# Patient Record
Sex: FEMALE | Race: WHITE | NOT HISPANIC OR LATINO | Employment: FULL TIME | URBAN - NONMETROPOLITAN AREA
[De-identification: names, ages, dates, MRNs, and addresses within clinical notes are randomized per-mention and may not be internally consistent; named-entity substitution may affect disease eponyms.]

---

## 2017-11-03 ENCOUNTER — OFFICE VISIT (OUTPATIENT)
Dept: URGENT CARE | Facility: CLINIC | Age: 27
End: 2017-11-03
Payer: COMMERCIAL

## 2017-11-03 PROCEDURE — 99203 OFFICE O/P NEW LOW 30 MIN: CPT

## 2017-11-03 PROCEDURE — S9088 SERVICES PROVIDED IN URGENT: HCPCS

## 2017-11-04 NOTE — PROGRESS NOTES
Assessment  1  Acute bronchitis, viral (466 0) (J20 8)    Plan  Acute bronchitis, viral    · Azithromycin 250 MG Oral Tablet (Zithromax Z-Riaz); TAKE 2 TABLETS ON DAY 1  THEN TAKE 1 TABLET A DAY FOR 4 DAYS   · DrRx Phenergan with Codeine 118 ML; 1 tsp every 4 hrs as needed   Disp 4 oz    Discussion/Summary  Discussion Summary:   Cough medicine and antibiotic as directed  Increase fluids 6 glasses of water a day  Steam treatments may be helpful  Sleeping propped up may be helpful  Understands and agrees with treatment plan: The treatment plan was reviewed with the patient/guardian  The patient/guardian understands and agrees with the treatment plan   Counseling Documentation With Imm: The patient was counseled regarding instructions for management  Chief Complaint  1  Cold Symptoms  Chief Complaint Free Text Note Form: C/o cough congestion for 2 weeks  History of Present Illness  HPI: THE COUGH IS UNREMITTING PARTICULARLY AT NIGHT  SHE HAS A KNOWN HISTORY OF ASTHMA  Hospital Based Practices Required Assessment:   Pain Assessment   the patient states they do not have pain  (on a scale of 0 to 10, the patient rates the pain at 0 )   Abuse And Domestic Violence Screen    Yes, the patient is safe at home  -- The patient states no one is hurting them  Depression And Suicide Screen  No, the patient has not had thoughts of hurting themself  No, the patient has not felt depressed in the past 7 days  Prefered Language is  [place]  Primary Language is  English  Review of Systems  Focused-Female:   ENT: as noted in HPI  Respiratory: as noted in HPI  Past Medical History  Active Problems And Past Medical History Reviewed: The active problems and past medical history were reviewed and updated today  Social History  Social History Reviewed: The social history was reviewed and updated today  Current Meds   1  Mirena IUD; Therapy: ([phone]) to Recorded    Allergies  1   No Known Drug Allergies    Vitals  Signs   Recorded: [id number] 05:31PM   Temperature: 96 4 F  Heart Rate: 81  Respiration: 18  Systolic: 541  Diastolic: 82  O2 Saturation: 100    Physical Exam    Eyes   Conjunctiva and lids: No swelling, erythema or discharge  Ears, Nose, Mouth, and Throat   External inspection of ears and nose: Normal     Otoscopic examination: Tympanic membranes translucent with normal light reflex  Canals patent without erythema  Nasal mucosa, septum, and turbinates: Normal without edema or erythema  Oropharynx: Normal with no erythema, edema, exudate or lesions  Pulmonary   Auscultation of lungs: Clear to auscultation  Cardiovascular   Auscultation of heart: Normal rate and rhythm, normal S1 and S2, without murmurs         Signatures   Electronically signed by : KITTY Peraza ; Nov  3 2017  5:48PM EST                       (Author)

## 2020-02-19 ENCOUNTER — APPOINTMENT (EMERGENCY)
Dept: CT IMAGING | Facility: HOSPITAL | Age: 30
End: 2020-02-19
Payer: COMMERCIAL

## 2020-02-19 ENCOUNTER — HOSPITAL ENCOUNTER (EMERGENCY)
Facility: HOSPITAL | Age: 30
Discharge: HOME | End: 2020-02-19
Attending: EMERGENCY MEDICINE | Admitting: EMERGENCY MEDICINE
Payer: COMMERCIAL

## 2020-02-19 ENCOUNTER — APPOINTMENT (EMERGENCY)
Dept: RADIOLOGY | Facility: HOSPITAL | Age: 30
End: 2020-02-19
Payer: COMMERCIAL

## 2020-02-19 VITALS
HEIGHT: 69 IN | BODY MASS INDEX: 25.01 KG/M2 | TEMPERATURE: 98.3 F | SYSTOLIC BLOOD PRESSURE: 136 MMHG | HEART RATE: 81 BPM | WEIGHT: 168.87 LBS | DIASTOLIC BLOOD PRESSURE: 64 MMHG | RESPIRATION RATE: 18 BRPM | OXYGEN SATURATION: 100 %

## 2020-02-19 DIAGNOSIS — S13.4XXA WHIPLASH INJURIES, INITIAL ENCOUNTER: ICD-10-CM
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
## 2020-02-19 PROCEDURE — 72125 CT NECK SPINE W/O DYE: CPT

## 2020-02-19 PROCEDURE — 70450 CT HEAD/BRAIN W/O DYE: CPT

## 2020-02-19 PROCEDURE — 71046 X-RAY EXAM CHEST 2 VIEWS: CPT

## 2020-02-19 PROCEDURE — 99284 EMERGENCY DEPT VISIT MOD MDM: CPT | Performed by: EMERGENCY MEDICINE

## 2020-02-19 PROCEDURE — 72072 X-RAY EXAM THORAC SPINE 3VWS: CPT

## 2020-02-19 PROCEDURE — 99284 EMERGENCY DEPT VISIT MOD MDM: CPT

## 2020-02-19 RX ORDER — NAPROXEN 500 MG/1
500 TABLET ORAL 2 TIMES DAILY WITH MEALS
Qty: 30 TABLET | Refills: 0 | Status: SHIPPED | OUTPATIENT
Start: 2020-02-19

## 2020-02-19 RX ORDER — IBUPROFEN 600 MG/1
600 TABLET ORAL ONCE
Status: COMPLETED | OUTPATIENT
Start: 2020-02-19 | End: 2020-02-19

## 2020-02-19 RX ORDER — CYCLOBENZAPRINE HCL 10 MG
10 TABLET ORAL ONCE
Status: COMPLETED | OUTPATIENT
Start: 2020-02-19 | End: 2020-02-19

## 2020-02-19 RX ORDER — CYCLOBENZAPRINE HCL 10 MG
10 TABLET ORAL 2 TIMES DAILY PRN
Qty: 20 TABLET | Refills: 0 | Status: SHIPPED | OUTPATIENT
Start: 2020-02-19

## 2020-02-19 RX ADMIN — CYCLOBENZAPRINE HYDROCHLORIDE 10 MG: 10 TABLET, FILM COATED ORAL at 16:19

## 2020-02-19 RX ADMIN — IBUPROFEN 600 MG: 600 TABLET ORAL at 16:19
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
## 2021-01-26 ENCOUNTER — DOCTOR'S OFFICE (OUTPATIENT)
Dept: URBAN - NONMETROPOLITAN AREA CLINIC 1 | Facility: CLINIC | Age: 31
Setting detail: OPHTHALMOLOGY
End: 2021-01-26

## 2021-01-26 ENCOUNTER — RX ONLY (RX ONLY)
Age: 31
End: 2021-01-26

## 2021-01-26 DIAGNOSIS — Z01.00: ICD-10-CM

## 2021-01-26 DIAGNOSIS — H52.03: ICD-10-CM

## 2021-01-26 DIAGNOSIS — H52.223: ICD-10-CM

## 2021-01-26 PROCEDURE — VIS/EMPLOY VIS/EMPLOYEE YEARLY VISION BENEFIT: Performed by: OPTOMETRIST

## 2021-01-26 ASSESSMENT — REFRACTION_MANIFEST
OD_VA1: 20/25
OS_SPHERE: PLANO
OD_CYLINDER: -0.75
OD_SPHERE: +0.50
OD_AXIS: 155
OD_SPHERE: +1.00
OS_VA1: 20/25
OS_VA1: 20/25
OD_CYLINDER: -0.75
OS_AXIS: 025
OS_SPHERE: +0.50
OS_CYLINDER: -0.75
OD_AXIS: 155
OS_AXIS: 025
OS_CYLINDER: -0.75
OD_VA1: 20/25

## 2021-01-26 ASSESSMENT — REFRACTION_AUTOREFRACTION
OS_SPHERE: 0.00
OS_AXIS: 028
OS_CYLINDER: -1.00
OD_CYLINDER: -1.25
OD_SPHERE: +0.50
OD_AXIS: 154

## 2021-01-26 ASSESSMENT — REFRACTION_CURRENTRX
OD_SPHERE: +0.25
OS_OVR_VA: 20/
OD_AXIS: 162
OS_VPRISM_DIRECTION: SV
OD_VPRISM_DIRECTION: SV
OD_OVR_VA: 20/
OS_AXIS: 020
OD_CYLINDER: -0.75
OS_CYLINDER: -0.75
OS_SPHERE: -0.25

## 2021-01-26 ASSESSMENT — TONOMETRY
OD_IOP_MMHG: 16
OS_IOP_MMHG: 16

## 2021-01-26 ASSESSMENT — VISUAL ACUITY
OS_BCVA: 20/60-2
OD_BCVA: 20/50-2

## 2021-01-26 ASSESSMENT — CONFRONTATIONAL VISUAL FIELD TEST (CVF)
OD_FINDINGS: FULL
OS_FINDINGS: FULL

## 2021-01-26 ASSESSMENT — SPHEQUIV_DERIVED
OS_SPHEQUIV: -0.5
OD_SPHEQUIV: -0.125
OS_SPHEQUIV: 0.125
OD_SPHEQUIV: 0.625
OD_SPHEQUIV: 0.125

## 2022-08-10 ENCOUNTER — OFFICE VISIT (OUTPATIENT)
Dept: URGENT CARE | Facility: CLINIC | Age: 32
End: 2022-08-10
Payer: COMMERCIAL

## 2022-08-10 ENCOUNTER — APPOINTMENT (OUTPATIENT)
Dept: URGENT CARE | Facility: CLINIC | Age: 32
End: 2022-08-10
Payer: COMMERCIAL

## 2022-08-10 ENCOUNTER — APPOINTMENT (OUTPATIENT)
Dept: RADIOLOGY | Facility: CLINIC | Age: 32
End: 2022-08-10
Payer: COMMERCIAL

## 2022-08-10 VITALS
TEMPERATURE: 100.1 F | SYSTOLIC BLOOD PRESSURE: 148 MMHG | DIASTOLIC BLOOD PRESSURE: 96 MMHG | HEIGHT: 70 IN | BODY MASS INDEX: 25.48 KG/M2 | OXYGEN SATURATION: 98 % | HEART RATE: 88 BPM | RESPIRATION RATE: 20 BRPM | WEIGHT: 178 LBS

## 2022-08-10 DIAGNOSIS — M25.461 PAIN AND SWELLING OF RIGHT KNEE: ICD-10-CM

## 2022-08-10 DIAGNOSIS — M25.561 PAIN AND SWELLING OF RIGHT KNEE: ICD-10-CM

## 2022-08-10 DIAGNOSIS — M70.51 BURSITIS OF RIGHT KNEE, UNSPECIFIED BURSA: Primary | ICD-10-CM

## 2022-08-10 PROCEDURE — 73564 X-RAY EXAM KNEE 4 OR MORE: CPT

## 2022-08-10 PROCEDURE — 99203 OFFICE O/P NEW LOW 30 MIN: CPT

## 2022-08-10 RX ORDER — NAPROXEN 500 MG/1
500 TABLET ORAL 2 TIMES DAILY WITH MEALS
Qty: 20 TABLET | Refills: 0 | Status: SHIPPED | OUTPATIENT
Start: 2022-08-10 | End: 2022-08-20

## 2022-08-10 RX ORDER — KETOROLAC TROMETHAMINE 30 MG/ML
15 INJECTION, SOLUTION INTRAMUSCULAR; INTRAVENOUS ONCE
Status: COMPLETED | OUTPATIENT
Start: 2022-08-10 | End: 2022-08-10

## 2022-08-10 RX ADMIN — KETOROLAC TROMETHAMINE 15 MG: 30 INJECTION, SOLUTION INTRAMUSCULAR; INTRAVENOUS at 12:58

## 2022-08-10 NOTE — PROGRESS NOTES
[facility]        NAME: Nani Guzman is a 28 y o  female  : 1990    MRN: [id number]  DATE: August 10, 2022  TIME: 1:39 PM    Assessment and Plan   Bursitis of right knee, unspecified bursa [M70 51]  1  Bursitis of right knee, unspecified bursa  XR knee 4+ vw right injury    ketorolac (TORADOL) injection 15 mg    Elastic Bandages & Supports (Knee Brace Hinged Adjust S/M) MISC    naproxen (Naprosyn) 500 mg tablet     xr- no acute abnormality  Suspect bursitis Will treat with nsaids and knee brace  toradol IM in office for pain  ED precautions advised    Patient Instructions     No acute osseous abnormality on  x-ray the swelling is most likely related to a bursitis this is treated with anti-inflammatories the knee brace and rest you may apply ice to the area or heat as well  If you develop a fever swelling gets worse he have hot red knee proceed to the ER  Follow up with PCP in 3-5 days  Proceed to  ER if symptoms worsen  Chief Complaint     Chief Complaint   Patient presents with    Knee Pain     Right knee         History of Present Illness       Patient is a 68-year-old female presents to the office today for right knee pain and swelling  She states that she noticed the swelling yesterday and last night the pain was severe  She states that the pain is better today than it was yesterday  She took Motrin yesterday  Denies any falls or trauma denies any fevers  denies any rashes  Denies any bug bites/tick bites  States that she was playing with her kids over the weekend swimming and playing baseball but denies any known injury to the knee  She states that it hurts when she walks on it or bends it  She describes the pain as a burning pain  Review of Systems   Review of Systems   Constitutional: Negative for chills and fever  Musculoskeletal: Positive for gait problem and joint swelling  Negative for arthralgias, back pain, myalgias, neck pain and neck stiffness     All other systems reviewed and are negative  Current Medications       Current Outpatient Medications:     Elastic Bandages & Supports (Knee Brace Hinged Adjust S/M) MISC, Use 1 Units 1 (one) time for 1 dose, Disp: 1 each, Rfl: 0    naproxen (Naprosyn) 500 mg tablet, Take 1 tablet (500 mg total) by mouth 2 (two) times a day with meals for 10 days, Disp: 20 tablet, Rfl: 0  No current facility-administered medications for this visit  Current Allergies     Allergies as of 08/10/2022    (No Known Allergies)            The following portions of the patient's history were reviewed and updated as appropriate: allergies, current medications, past family history, past medical history, past social history, past surgical history and problem list      History reviewed  No pertinent past medical history  History reviewed  No pertinent surgical history  History reviewed  No pertinent family history  Medications have been verified  Objective   /96   Pulse 88   Temp 100 1 °F (37 8 °C)   Resp 20   Ht 5' 10" (1 778 m)   Wt 80 7 kg (178 lb)   SpO2 98%   BMI 25 54 kg/m²        Physical Exam     Physical Exam  Vitals and nursing note reviewed  Constitutional:       General: She is not in acute distress  Appearance: Normal appearance  She is normal weight  She is not ill-appearing or toxic-appearing  Cardiovascular:      Rate and Rhythm: Normal rate and regular rhythm  Pulses: Normal pulses  Heart sounds: Normal heart sounds  Pulmonary:      Effort: Pulmonary effort is normal  No respiratory distress  Breath sounds: Normal breath sounds  No stridor  No wheezing, rhonchi or rales  Chest:      Chest wall: No tenderness  Musculoskeletal:         General: Swelling and tenderness present  No deformity or signs of injury  Right knee: Swelling present  No deformity, effusion, erythema, ecchymosis, lacerations, bony tenderness or crepitus  Decreased range of motion  Tenderness present  No medial joint line, lateral joint line, MCL, LCL, ACL, PCL or patellar tendon tenderness  No LCL laxity, MCL laxity, ACL laxity or PCL laxity  Normal alignment, normal meniscus and normal patellar mobility  Normal pulse  Right lower leg: No edema  Left lower leg: No edema  Legs:    Skin:     General: Skin is warm and dry  Capillary Refill: Capillary refill takes less than 2 seconds  Neurological:      General: No focal deficit present  Mental Status: She is alert and oriented to person, place, and time

## 2022-08-10 NOTE — PATIENT INSTRUCTIONS
No acute osseous abnormality on  x-ray the swelling is most likely related to a bursitis this is treated with anti-inflammatories the knee brace and rest you may apply ice to the area or heat as well  If you develop a fever swelling gets worse he have hot red knee proceed to the ER

## 2024-07-10 ENCOUNTER — APPOINTMENT (EMERGENCY)
Dept: RADIOLOGY | Facility: HOSPITAL | Age: 34
End: 2024-07-10
Payer: COMMERCIAL

## 2024-07-10 ENCOUNTER — HOSPITAL ENCOUNTER (EMERGENCY)
Facility: HOSPITAL | Age: 34
Discharge: HOME | End: 2024-07-10
Attending: EMERGENCY MEDICINE
Payer: COMMERCIAL

## 2024-07-10 VITALS
DIASTOLIC BLOOD PRESSURE: 93 MMHG | HEART RATE: 93 BPM | OXYGEN SATURATION: 99 % | WEIGHT: 171 LBS | SYSTOLIC BLOOD PRESSURE: 153 MMHG | HEIGHT: 69 IN | BODY MASS INDEX: 25.33 KG/M2 | RESPIRATION RATE: 16 BRPM | TEMPERATURE: 97.7 F

## 2024-07-10 DIAGNOSIS — M25.561 ACUTE PAIN OF RIGHT KNEE: Primary | ICD-10-CM

## 2024-07-10 PROCEDURE — 73564 X-RAY EXAM KNEE 4 OR MORE: CPT

## 2024-07-10 PROCEDURE — 99284 EMERGENCY DEPT VISIT MOD MDM: CPT | Performed by: EMERGENCY MEDICINE

## 2024-07-10 RX ORDER — NAPROXEN 500 MG/1
500 TABLET ORAL 2 TIMES DAILY WITH MEALS
Qty: 30 TABLET | Refills: 0 | Status: SHIPPED | OUTPATIENT
Start: 2024-07-10

## 2024-07-10 RX ORDER — KETOROLAC TROMETHAMINE 30 MG/ML
30 INJECTION, SOLUTION INTRAMUSCULAR; INTRAVENOUS ONCE
Status: COMPLETED | OUTPATIENT
Start: 2024-07-10 | End: 2024-07-10

## 2024-07-10 RX ORDER — LEVOTHYROXINE SODIUM 0.05 MG/1
50 TABLET ORAL DAILY
COMMUNITY
Start: 2024-06-22

## 2024-07-10 RX ORDER — PAROXETINE 10 MG/1
10 TABLET, FILM COATED ORAL DAILY
COMMUNITY

## 2024-07-10 RX ADMIN — KETOROLAC TROMETHAMINE 30 MG: 30 INJECTION, SOLUTION INTRAMUSCULAR at 18:30

## 2024-07-10 NOTE — Clinical Note
Miranda Israel was seen and treated in our emergency department on 7/10/2024.        No work until cleared by Family Doctor/Orthopedics        Diagnosis:     Miranda  .    She may return on this date:          If you have any questions or concerns, please don't hesitate to call.      Filomena Montenegro, DO    ______________________________           _______________          _______________  Hospital Representative                              Date                                Time

## 2024-07-10 NOTE — ED PROVIDER NOTES
History  Chief Complaint   Patient presents with    Knee Pain     Patient reports right knee pain worsening over the past two months. Reports prior hx of bursitis in same knee about one year ago.      Patient is a 34-year-old female presenting to the emergency department today complaining of pain in her right knee, states she has occasional flareups of pain in the right knee without any specific traumatic injury, she denies any fever, she was active in various sports when she was younger including track, cheerleading, but denies having any injury remotely, no numbness or tingling, she does have slight joint swelling, she was diagnosed with bursitis about a year ago but admits that instead of resting her knee and letting it heal she went right back to work, she has not been seen by an orthopedist        Prior to Admission Medications   Prescriptions Last Dose Informant Patient Reported? Taking?   PARoxetine (PAXIL) 10 mg tablet 7/10/2024  Yes Yes   Sig: Take 10 mg by mouth daily   levothyroxine 50 mcg tablet 7/10/2024  Yes Yes   Sig: Take 50 mcg by mouth daily      Facility-Administered Medications: None       Past Medical History:   Diagnosis Date    Disease of thyroid gland        Past Surgical History:   Procedure Laterality Date    US GUIDED THYROID BIOPSY         No family history on file.  I have reviewed and agree with the history as documented.    E-Cigarette/Vaping     E-Cigarette/Vaping Substances     Social History     Tobacco Use    Smoking status: Former     Current packs/day: 0.50     Types: Cigarettes    Smokeless tobacco: Never   Substance Use Topics    Alcohol use: Yes     Comment: social    Drug use: Not Currently       Review of Systems   Constitutional: Negative.    HENT: Negative.     Eyes: Negative.    Respiratory: Negative.     Cardiovascular: Negative.    Gastrointestinal: Negative.    Endocrine: Negative.    Genitourinary: Negative.    Musculoskeletal:  Positive for arthralgias and joint  swelling.   Skin: Negative.    Allergic/Immunologic: Negative.    Neurological: Negative.    Hematological: Negative.    Psychiatric/Behavioral: Negative.         Physical Exam  Physical Exam  Constitutional:       Appearance: She is well-developed.   HENT:      Head: Normocephalic and atraumatic.   Eyes:      Conjunctiva/sclera: Conjunctivae normal.      Pupils: Pupils are equal, round, and reactive to light.   Cardiovascular:      Rate and Rhythm: Normal rate.   Pulmonary:      Effort: Pulmonary effort is normal.   Abdominal:      Palpations: Abdomen is soft.   Musculoskeletal:         General: Normal range of motion.      Cervical back: Normal range of motion and neck supple.        Legs:       Comments: Tenderness to palpate over the patella of the right knee, mild joint effusion, no erythema, no increased warmth, no rash or lesions, no bony deformity, distal sensation and motor is intact   Skin:     General: Skin is warm and dry.   Neurological:      Mental Status: She is alert and oriented to person, place, and time.         Vital Signs  ED Triage Vitals [07/10/24 1809]   Temperature Pulse Respirations Blood Pressure SpO2   97.7 °F (36.5 °C) 93 16 153/93 99 %      Temp Source Heart Rate Source Patient Position - Orthostatic VS BP Location FiO2 (%)   Temporal Monitor Sitting Right arm --      Pain Score       8           Vitals:    07/10/24 1809   BP: 153/93   Pulse: 93   Patient Position - Orthostatic VS: Sitting         Visual Acuity      ED Medications  Medications   ketorolac (TORADOL) injection 30 mg (30 mg Intramuscular Given 7/10/24 1830)       Diagnostic Studies  Results Reviewed       None                   XR knee 4+ vw left injury   ED Interpretation by Filomena Montenegro DO (07/10 1852)                 Procedures  Procedures         ED Course  ED Course as of 07/10/24 1942   Wed Jul 10, 2024   1942 XR knee 4+ vw left injury                                               Medical Decision Making   Patient  presents with recurrent right knee pain.  Given history, exam and work-up, patient likely has knee strain.  I have low suspicion for fracture, dislocation, significant ligamentous injury, septic arthritis, gout flare, new autoimmune arthropathy or gonococcal arthropathy.      Problems Addressed:  Acute pain of right knee: acute illness or injury    Amount and/or Complexity of Data Reviewed  Radiology: ordered and independent interpretation performed. Decision-making details documented in ED Course.    Risk  OTC drugs.  Prescription drug management.                 Disposition  Final diagnoses:   Acute pain of right knee     Time reflects when diagnosis was documented in both MDM as applicable and the Disposition within this note       Time User Action Codes Description Comment    7/10/2024  6:45 PM Filomena Montenegro Add [M25.561] Acute pain of right knee           ED Disposition       ED Disposition   Discharge    Condition   Stable    Date/Time   Wed Jul 10, 2024  6:45 PM    Comment   Miranda Israel discharge to home/self care.                   Follow-up Information       Follow up With Specialties Details Why Contact Info    [facility] In 1 week  [address] [facility]  [address] [place] [id number]  [phone]              Discharge Medication List as of 7/10/2024  6:46 PM        START taking these medications    Details   naproxen (Naprosyn) 500 mg tablet Take 1 tablet (500 mg total) by mouth 2 (two) times a day with meals, Starting Wed 7/10/2024, Normal           CONTINUE these medications which have NOT CHANGED    Details   levothyroxine 50 mcg tablet Take 50 mcg by mouth daily, Starting Sat 6/22/2024, Historical Med      PARoxetine (PAXIL) 10 mg tablet Take 10 mg by mouth daily, Historical Med                 PDMP Review       None            ED Provider  Electronically Signed by             Filomena Montenegro DO  07/10/24 1942

## 2024-07-15 ENCOUNTER — OFFICE VISIT (OUTPATIENT)
Dept: OBGYN CLINIC | Facility: CLINIC | Age: 34
End: 2024-07-15
Payer: COMMERCIAL

## 2024-07-15 VITALS
HEART RATE: 78 BPM | HEIGHT: 69 IN | SYSTOLIC BLOOD PRESSURE: 130 MMHG | WEIGHT: 170.6 LBS | DIASTOLIC BLOOD PRESSURE: 78 MMHG | TEMPERATURE: 97.6 F | OXYGEN SATURATION: 100 % | BODY MASS INDEX: 25.27 KG/M2

## 2024-07-15 DIAGNOSIS — M22.2X1 PATELLOFEMORAL DISORDER OF RIGHT KNEE: Primary | ICD-10-CM

## 2024-07-15 DIAGNOSIS — M25.561 CHRONIC PAIN OF RIGHT KNEE: Chronic | ICD-10-CM

## 2024-07-15 DIAGNOSIS — G89.29 CHRONIC PAIN OF RIGHT KNEE: Chronic | ICD-10-CM

## 2024-07-15 PROCEDURE — 99204 OFFICE O/P NEW MOD 45 MIN: CPT | Performed by: ORTHOPAEDIC SURGERY

## 2024-07-15 NOTE — PROGRESS NOTES
ASSESSMENT/PLAN:    Diagnoses and all orders for this visit:    Patellofemoral disorder of right knee    Chronic pain of right knee  -     Ambulatory Referral to Orthopedic Surgery      Reviewed with patient that physical exam and images were consistent with right Patellofemoral Syndrome and associated quadriceps weakness.  Discussed Physical Therapy as a means to strengthen quadriceps muscles, but she elected to complete exercises at home. Demonstrated quad set with straight leg raise with increased resistance. Injection was discussed but patient also deferred treatment as well. She has a prescription for Naproxen and was advised that consistent use will provide maximum benefits. A brace may be worn initially for comfort, however she should not depend on usage. Patient will follow up as needed unless symptoms worsen that she feels she needs a cortisone injection or would like a script for physical therapy.    Return if symptoms worsen or fail to improve.      _____________________________________________________  CHIEF COMPLAINT:  Chief Complaint   Patient presents with    Right Knee - Pain         SUBJECTIVE:  Miranda Israel is a 34 y.o. year old female who presents right knee pain. 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 She reported to Urgent Care and received a toradol injection. She states her pain has been inconsistent but is progressively worsening. She is also notices times of instability. She reports more intense and longer lasting painful flare-ups. Due to her discomfort, she reported to the ED on 7/10/2024 and received crutches and a Toradol injection. She does not present with the crutches today as she is having improvements since Wednesday.   She locates her pain and swelling on anterior medial joint line and medial border of the patella. She uses tylenol or ibuprofen as needed.     PAST MEDICAL HISTORY:  Past Medical History:  "  Diagnosis Date    Disease of thyroid gland     Skin disorder        PAST SURGICAL HISTORY:  Past Surgical History:   Procedure Laterality Date    US GUIDED THYROID BIOPSY         FAMILY HISTORY:  Family History   Problem Relation Age of Onset    Rheumatologic disease Mother     Cancer Maternal Grandfather         Skin cancer    Rheumatologic disease Maternal Uncle        SOCIAL HISTORY:  Social History     Tobacco Use    Smoking status: Former     Current packs/day: 0.50     Types: Cigarettes    Smokeless tobacco: Never   Vaping Use    Vaping status: Never Used   Substance Use Topics    Alcohol use: Yes     Comment: social    Drug use: Never       MEDICATIONS:    Current Outpatient Medications:     levothyroxine 50 mcg tablet, Take 50 mcg by mouth daily, Disp: , Rfl:     naproxen (Naprosyn) 500 mg tablet, Take 1 tablet (500 mg total) by mouth 2 (two) times a day with meals, Disp: 30 tablet, Rfl: 0    PARoxetine (PAXIL) 10 mg tablet, Take 10 mg by mouth daily, Disp: , Rfl:     ALLERGIES:  No Known Allergies    Review of systems:   Constitutional: Negative for fatigue, fever or loss of apetite.   HENT: Negative.    Respiratory: Negative for shortness of breath, dyspnea.    Cardiovascular: Negative for chest pain/tightness.   Gastrointestinal: Negative for abdominal pain, N/V.   Endocrine: Negative for cold/heat intolerance, unexplained weight loss/gain.   Genitourinary: Negative for flank pain, dysuria, hematuria.   Musculoskeletal: As noted in HPI   Skin: Negative for rash.    Neurological: Negative  Psychiatric/Behavioral: Negative for agitation.  _____________________________________________________  PHYSICAL EXAMINATION:    Blood pressure 130/78, pulse 78, temperature 97.6 °F (36.4 °C), temperature source Temporal, height 5' 9\" (1.753 m), weight 77.4 kg (170 lb 9.6 oz), SpO2 100%.    General: well developed and well nourished, alert, oriented times 3, and appears comfortable  Psychiatric: Normal  HEENT: " Benign  Cardiovascular: Regular    Pulmonary: No wheezing or stridor  Abdomen: Soft, Nontender  Skin: No masses, erythema, lacerations, fluctation, ulcerations  Neurovascular: Sensory and Motor grossly intact.     MUSCULOSKELETAL EXAMINATION:    right knee(s) -   Patient ambulates with steady gait pattern  Uses No assistive device  No anatomical deformity  Skin is warm and dry to touch with no signs of erythema, ecchymosis, or infection   No soft tissue swelling or effusion noted  ROM (0° - 120°)   Strength: 4/5 throughout  TTP over patellar tendon  Flexor and extensor mechanisms are intact   Knee is  stable to varus and valgus stress.   + Patellar apprehension.   Lateral translation 50% on right compared to 25% on left  Medial translation 25% on both knees  Patella tracks centrally with palpable clunk  Calf compartments are soft and supple  - Osiel's sign  2+ DP and PT pulses with brisk capillary refill to the toes  Sural, saphenous, tibial, superficial, and deep peroneal motor and sensory distributions intact  Sensation light touch intact distally    _____________________________________________________  STUDIES REVIEWED:  X-Ray of right knee obtained on 7/10/2024 were reviewed and demonstrate no acute osseous abnormalities or fractures. No significant degenerative changes.    The x-ray report was reviewed.    The ED note was reviewed.        Scribe Attestation      I,:  Miriam Wilson am acting as a scribe while in the presence of the attending physician.:       I,:  Mehdi Nelson personally performed the services described in this documentation    as scribed in my presence.: